# Patient Record
Sex: MALE | Race: WHITE | Employment: OTHER | URBAN - METROPOLITAN AREA
[De-identification: names, ages, dates, MRNs, and addresses within clinical notes are randomized per-mention and may not be internally consistent; named-entity substitution may affect disease eponyms.]

---

## 2023-06-06 ENCOUNTER — OFFICE VISIT (OUTPATIENT)
Dept: NEUROLOGY | Age: 58
End: 2023-06-06
Payer: OTHER GOVERNMENT

## 2023-06-06 VITALS
DIASTOLIC BLOOD PRESSURE: 90 MMHG | HEIGHT: 72 IN | RESPIRATION RATE: 16 BRPM | BODY MASS INDEX: 36.84 KG/M2 | HEART RATE: 74 BPM | SYSTOLIC BLOOD PRESSURE: 146 MMHG | WEIGHT: 272 LBS | OXYGEN SATURATION: 94 %

## 2023-06-06 DIAGNOSIS — G45.9 TIA (TRANSIENT ISCHEMIC ATTACK): ICD-10-CM

## 2023-06-06 DIAGNOSIS — M50.30 DDD (DEGENERATIVE DISC DISEASE), CERVICAL: Primary | ICD-10-CM

## 2023-06-06 DIAGNOSIS — R20.2 PARESTHESIA: ICD-10-CM

## 2023-06-06 PROCEDURE — 99215 OFFICE O/P EST HI 40 MIN: CPT | Performed by: PSYCHIATRY & NEUROLOGY

## 2023-06-06 RX ORDER — NALOXONE HYDROCHLORIDE 4 MG/.1ML
SPRAY NASAL
COMMUNITY
Start: 2023-01-09

## 2023-06-06 RX ORDER — DIAZEPAM 5 MG/1
5 TABLET ORAL EVERY 12 HOURS PRN
COMMUNITY
Start: 2019-03-19

## 2023-06-06 RX ORDER — ALLOPURINOL 100 MG/1
100 TABLET ORAL DAILY
COMMUNITY

## 2023-06-06 RX ORDER — OMEPRAZOLE 20 MG/1
20 CAPSULE, DELAYED RELEASE ORAL DAILY
COMMUNITY

## 2023-06-06 RX ORDER — MULTIVIT-MIN/IRON/FOLIC ACID/K 18-600-40
CAPSULE ORAL ONCE
COMMUNITY

## 2023-06-06 ASSESSMENT — ENCOUNTER SYMPTOMS
RESPIRATORY NEGATIVE: 1
GASTROINTESTINAL NEGATIVE: 1
EYES NEGATIVE: 1
ALLERGIC/IMMUNOLOGIC NEGATIVE: 1

## 2023-06-06 NOTE — PROGRESS NOTES
file   Intimate Partner Violence: Not on file   Housing Stability: Not on file       Family History:   No family history on file. Current Outpatient Medications on File Prior to Visit   Medication Sig Dispense Refill    PREDNISONE 5 MG/5 ML ORAL SOLN CMPD Take 10 mg by mouth      diazePAM (VALIUM) 5 MG tablet Take 1 tablet by mouth every 12 hours as needed. Max Daily Amount: 10 mg      omeprazole (PRILOSEC) 20 MG delayed release capsule Take 1 capsule by mouth daily      PSYLLIUM PO Take by mouth      allopurinol (ZYLOPRIM) 100 MG tablet Take 1 tablet by mouth daily      Vitamin D, Cholecalciferol, 50 MCG (2000 UT) CAPS Take by mouth once      naloxone 4 MG/0.1ML LIQD nasal spray SPRAY 1 SPRAY IN ONE NOSTRIL ONLY AS NEEDED FOR OPIOID OVERDOSE FOR SIGNS OF OPIOID OVERDOSE. CALL 911. IF NO RESPONSE IN 2-3 MINUTES OR IF STOPS BREATHING AGAIN, GIVE THE SECOND DOSE OF NALOXONE IN THE OTHER NOSTRIL USING SECOND SPRAY DEVICE *DO NOT PRIME DEVICE*      TURMERIC PO Take by mouth      amLODIPine (NORVASC) 10 MG tablet Take 1 tablet by mouth daily      Ginger, Zingiber officinalis, 250 MG CAPS Take by mouth      HYDROcodone-acetaminophen (NORCO)  MG per tablet Take 1 tablet by mouth.      losartan (COZAAR) 100 MG tablet Take 1 tablet by mouth daily      methocarbamol (ROBAXIN) 750 MG tablet TAKE ONE TABLET BY MOUTH THREE TIMES A DAY AS NEEDED       No current facility-administered medications on file prior to visit. Allergies   Allergen Reactions    Hydrochlorothiazide     Venlafaxine     Hydrochlorothiazide-Propranolol [Propranolol-Hctz] Nausea And Vomiting       Review of Systems:  Review of Systems   Constitutional:  Positive for fatigue. HENT: Negative. Eyes: Negative. Respiratory: Negative. Gastrointestinal: Negative. Endocrine: Negative. Musculoskeletal:  Positive for arthralgias and neck stiffness. Allergic/Immunologic: Negative.     Neurological:  Positive for dizziness, weakness,

## 2023-07-03 ENCOUNTER — HOSPITAL ENCOUNTER (OUTPATIENT)
Dept: CT IMAGING | Age: 58
Discharge: HOME | End: 2023-07-06
Attending: PSYCHIATRY & NEUROLOGY
Payer: OTHER GOVERNMENT

## 2023-07-03 DIAGNOSIS — G45.9 TIA (TRANSIENT ISCHEMIC ATTACK): ICD-10-CM

## 2023-07-03 PROCEDURE — 70496 CT ANGIOGRAPHY HEAD: CPT

## 2023-07-03 PROCEDURE — 6360000004 HC RX CONTRAST MEDICATION: Performed by: PSYCHIATRY & NEUROLOGY

## 2023-07-03 RX ADMIN — IOPAMIDOL 100 ML: 755 INJECTION, SOLUTION INTRAVENOUS at 10:11
